# Patient Record
Sex: FEMALE | Race: WHITE | NOT HISPANIC OR LATINO | Employment: FULL TIME | URBAN - NONMETROPOLITAN AREA
[De-identification: names, ages, dates, MRNs, and addresses within clinical notes are randomized per-mention and may not be internally consistent; named-entity substitution may affect disease eponyms.]

---

## 2020-01-30 PROBLEM — Z83.719 FAMILY HISTORY OF POLYPS IN THE COLON: Status: ACTIVE | Noted: 2020-01-30

## 2024-06-25 ENCOUNTER — HOSPITAL ENCOUNTER (EMERGENCY)
Facility: HOSPITAL | Age: 34
Discharge: HOME | End: 2024-06-25

## 2024-06-25 ENCOUNTER — APPOINTMENT (OUTPATIENT)
Dept: GENERAL RADIOLOGY | Facility: HOSPITAL | Age: 34
End: 2024-06-25

## 2024-06-25 VITALS
TEMPERATURE: 98.1 F | HEIGHT: 65 IN | OXYGEN SATURATION: 98 % | BODY MASS INDEX: 48.82 KG/M2 | HEART RATE: 93 BPM | SYSTOLIC BLOOD PRESSURE: 144 MMHG | DIASTOLIC BLOOD PRESSURE: 96 MMHG | WEIGHT: 293 LBS | RESPIRATION RATE: 18 BRPM

## 2024-06-25 DIAGNOSIS — S62.336A DISPLACED FRACTURE OF NECK OF FIFTH METACARPAL BONE, RIGHT HAND, INITIAL ENCOUNTER FOR CLOSED FRACTURE: Primary | ICD-10-CM

## 2024-06-25 PROCEDURE — 73130 X-RAY EXAM OF HAND: CPT

## 2024-06-25 PROCEDURE — 99283 EMERGENCY DEPT VISIT LOW MDM: CPT

## 2024-06-25 RX ORDER — HYDROCODONE BITARTRATE AND ACETAMINOPHEN 5; 325 MG/1; MG/1
1 TABLET ORAL EVERY 8 HOURS PRN
Qty: 12 TABLET | Refills: 0 | Status: SHIPPED | OUTPATIENT
Start: 2024-06-25

## 2024-06-25 RX ORDER — DIAZEPAM 2 MG/1
2 TABLET ORAL ONCE
Status: DISCONTINUED | OUTPATIENT
Start: 2024-06-25 | End: 2024-06-25 | Stop reason: HOSPADM

## 2024-06-25 RX ORDER — ONDANSETRON 4 MG/1
4 TABLET, ORALLY DISINTEGRATING ORAL ONCE
Status: DISCONTINUED | OUTPATIENT
Start: 2024-06-25 | End: 2024-06-25 | Stop reason: HOSPADM

## 2024-06-25 RX ORDER — LIDOCAINE HYDROCHLORIDE 20 MG/ML
10 INJECTION, SOLUTION INFILTRATION; PERINEURAL ONCE
Status: COMPLETED | OUTPATIENT
Start: 2024-06-25 | End: 2024-06-25

## 2024-06-25 RX ADMIN — LIDOCAINE HYDROCHLORIDE 10 ML: 20 INJECTION, SOLUTION INFILTRATION; PERINEURAL at 11:55

## 2024-06-25 NOTE — ED PROVIDER NOTES
Subjective   History of Present Illness    Patient is a pleasant 33-year-old female chief complaint right hand injury status post fall.    Patient describes that she was running after some guinea when fell into a duck hole.  She landed with her right upper extremity extended but her hand was internally rotated and she landed on it medially.  She is right-hand dominant.  She denies any head or neck injury.  She complains immediate pain and deformity.  She went to urgent care and completed an x-ray.  She was told that she does have an obliquely oriented volarly displaced and angulated fracture of the distal fifth metacarpal bone with associated soft tissue swelling.  Because of this, she is advised to the ER for reduction and splinting.  Patient denies any loss sensation.  She does complain of limited movement.  She does complain quite a bit of pain.  She has not eaten today.  She described she is otherwise healthy.  She denies being on a blood thinner.    Review of Systems   All other systems reviewed and are negative.      Past Medical History:   Diagnosis Date    Anxiety     Back pain     Candidiasis of vulva and vagina     Constipation     Contraception     Depression     Family history of colonic polyps     GERD (gastroesophageal reflux disease)     Migraine     Visit for routine gyn exam     Vitamin D deficiency     White coat syndrome without diagnosis of hypertension        Allergies   Allergen Reactions    Amoxicillin Rash    Penicillins Rash       Past Surgical History:   Procedure Laterality Date    CHOLECYSTECTOMY      COLONOSCOPY N/A 2/13/2020    Procedure: COLONOSCOPY WITH ANESTHESIA;  Surgeon: Alfreda Osei MD;  Location: [facility];  Service: Gastroenterology;  Laterality: N/A;  pre: rectal pain, rectal bleeding, family hx polyps  post: diverticulosis   Ceballos, George Montes PA-C    ENDOSCOPY N/A 7/10/2018    Dr. Ovalle Grade B reflux esophagitis-biopsied; Z-line regular-35cm from the  incisors; No gross lesions in the stomach-biopsied; No gross lesions in the second portion of the duodenum    OTHER SURGICAL HISTORY      reset arm    PAP SMEAR  07/23/2012    WISDOM TOOTH EXTRACTION         Family History   Problem Relation Age of Onset    Hypertension Mother     Depression Mother     Colon polyps Mother 48    Diabetes Other         type 2    Hypertension Father     Hyperlipidemia Father     Diabetes Father     Breast cancer Neg Hx     Ovarian cancer Neg Hx     Uterine cancer Neg Hx     Colon cancer Neg Hx     Esophageal cancer Neg Hx     Liver cancer Neg Hx     Liver disease Neg Hx     Rectal cancer Neg Hx     Stomach cancer Neg Hx        Social History     Socioeconomic History    Marital status: Single   Tobacco Use    Smoking status: Never    Smokeless tobacco: Never   Vaping Use    Vaping status: Never Used   Substance and Sexual Activity    Alcohol use: Yes     Comment: Occasionally    Drug use: No    Sexual activity: Yes     Partners: Male     Birth control/protection: None     Comment: stopped Nuvaring       Prior to Admission medications    Medication Sig Start Date End Date Taking? Authorizing Provider   etonogestrel-ethinyl estradiol (NuvaRing) 0.12-0.015 MG/24HR vaginal ring Insert vaginally and leave in place for 3 consecutive weeks, then remove for 1 week. 5/26/21   Fatemeh Duffy APRN   metFORMIN (GLUCOPHAGE) 500 MG tablet TAKE ONE TABLET BY MOUTH 2 TIMES A DAY 9/29/21   Fatemeh Duffy APRN   multivitamin with minerals tablet tablet Take 1 tablet by mouth Daily.    Provider, MD Cyndy       Medications   morphine injection 4 mg (4 mg Intramuscular Not Given 6/25/24 1136)   ondansetron ODT (ZOFRAN-ODT) disintegrating tablet 4 mg (4 mg Oral Not Given 6/25/24 1136)   diazePAM (VALIUM) tablet 2 mg (2 mg Oral Not Given 6/25/24 1136)   lidocaine (XYLOCAINE) 2% injection 10 mL (10 mL Injection Given 6/25/24 1155)       /96 (BP Location: Left arm, Patient Position:  "Sitting)   Pulse 93   Temp 98.1 °F (36.7 °C) (Oral)   Resp 18   Ht 165.1 cm (65\")   Wt (!) 144 kg (317 lb)   LMP 05/27/2024 (Approximate)   SpO2 98%   BMI 52.75 kg/m²       Objective   Physical Exam  Constitutional:       Appearance: Normal appearance. She is obese.   HENT:      Head: Normocephalic.      Nose: Nose normal.      Mouth/Throat:      Mouth: Mucous membranes are moist.   Eyes:      Extraocular Movements: Extraocular movements intact.   Cardiovascular:      Rate and Rhythm: Normal rate and regular rhythm.   Pulmonary:      Effort: Pulmonary effort is normal.      Breath sounds: Normal breath sounds.   Musculoskeletal:         General: Swelling and tenderness present.      Right forearm: Normal.      Right wrist: No tenderness, bony tenderness or snuff box tenderness. Normal range of motion. Normal pulse.      Right hand: Swelling, deformity, tenderness and bony tenderness present. Normal pulse.        Arms:       Cervical back: Normal range of motion and neck supple.      Comments: Patient does have obvious ecchymosis and swelling of the right fifth MP with tenderness along her fifth metacarpal.  Her pinky finger is flexed forward at a 90 degree angle.  She is nontender the rest of her hand with palpable pulses bilaterally.  Nontender wrist.  Negative snuffbox tenderness pain   Skin:     General: Skin is warm.   Neurological:      Mental Status: She is alert.         Upper Extremity Dislocation    Date/Time: 6/25/2024 12:44 PM    Performed by: Porfirio Higuera PA  Authorized by: Porfirio Higuera PA  Consent: The procedure was performed in an emergent situation. Verbal consent obtained. Written consent obtained.  Risks and benefits: risks, benefits and alternatives were discussed  Consent given by: patient  Patient understanding: patient states understanding of the procedure being performed  Site marked: the operative site was marked  Imaging studies: imaging studies " available  Injury location: hand  Location details: right hand  Injury type: fracture-dislocation (reduction of fracture)  Pre-procedure neurovascular assessment: neurovascularly intact  Pre-procedure distal perfusion: normal  Pre-procedure neurological function: normal  Pre-procedure range of motion: normal  Anesthesia: local infiltration    Anesthesia:  Local anesthesia used: yes  Local Anesthetic: lidocaine 1% without epinephrine  Anesthetic total: 6 mL    Sedation:  Patient sedated: no    Manipulation performed: yes  Skin traction used: no  Skeletal traction used: no  Reduction successful: yes  X-ray confirmed reduction: yes  Immobilization: splint  Supplies used: plaster  Post-procedure neurovascular assessment: post-procedure neurovascularly intact  Post-procedure distal perfusion: normal  Post-procedure neurological function: normal  Post-procedure range of motion: normal  Patient tolerance: patient tolerated the procedure well with no immediate complications               Lab Results (last 24 hours)       ** No results found for the last 24 hours. **            XR Hand 3+ View Right    Result Date: 6/25/2024  Narrative: XR HAND 3+ VW RIGHT- 6/25/2024 at 11:56 a.m.  HISTORY: post reduction  COMPARISON: 6/25/2024 exam 11:56 a.m.  FINDINGS: 3 views of the right hand are obtained.  Dramatic interval improvement in the fifth metacarpal shaft fracture with reduction. Only mild residual radial displacement. Prominent soft tissue swelling. No additional fracture identified.  Pression: 1. Marked interval improvement in the alignment of the right fifth metacarpal fracture with reduction.   This report was signed and finalized on 6/25/2024 12:53 PM by Dr. Rasheeda Hahn MD.      XR Hand 3+ View Right    Result Date: 6/25/2024  Narrative: EXAMINATION: XR HAND 3+ VW RIGHT-  6/25/2024 12:15 PM  HISTORY: post reduction  COMPARISON: 6/25/2024  TECHNIQUE: Frontal, lateral and oblique radiographs of the RIGHT hand were  provided for review.  FINDINGS: Obliquely oriented fifth metacarpal fracture with persistent volar angulation. The degree of angulation is very similar to the prior examination. I don't see any definite evidence of any new or additional fracture deformities elsewhere in the hand or wrist. There is overlying soft tissue swelling adjacent to the fifth metacarpal.       Impression:  1.  Persistent angulation of the displaced and angulated acute fifth metacarpal fracture, similar to the previous examination.   This report was signed and finalized on 6/25/2024 12:17 PM by Dr. Severo Flynn MD.      XR Hand 3+ View Right    Result Date: 6/25/2024  Narrative: XR HAND 3+ VW RIGHT- 6/25/2024 8:48 AM  HISTORY: fall, pain and swelling to right hand, worse at fifth knuckle region; W19.XXXA-Unspecified fall, initial encounter; M79.641-Pain in right hand; M79.89-Other specified soft tissue disorders  COMPARISON: None available  FINDINGS: Frontal, lateral, and oblique radiographs of the hand were provided for review.  There is an obliquely oriented fracture of the distal fifth metacarpal bone which is displaced and angulated volarly and angulated slightly laterally. No additional fracture or dislocation is seen. There is soft tissue swelling medially.      Impression:  1. Obliquely oriented volarly displaced and angulated fracture of the distal fifth metacarpal bone with associated soft tissue swelling.  This report was signed and finalized on 6/25/2024 9:57 AM by Cleve Aguilera.       ED Course  ED Course as of 06/25/24 1305   Tue Jun 25, 2024   1247 Patient tolerated reduction without any complications.  She is in a boxer splint.  Short course of pain medication has been prescribed.  Advised patient follow-up with a hand surgeon.  Avoid use of right hand till cleared.  Patient was understanding she will be discharged in stable condition. [TK]      ED Course User Index  [TK] Porfirio Higuera PA           MDM      Final diagnoses:   Displaced fracture of neck of fifth metacarpal bone, right hand, initial encounter for closed fracture       Position: Patient will be discharged in stable condition.       Porfirio Higuera PA  06/25/24 1252       Porfirio Higuera PA  06/25/24 1254       Porfirio Higuera PA  06/25/24 1300

## 2024-06-26 ENCOUNTER — PATIENT ROUNDING (BHMG ONLY) (OUTPATIENT)
Dept: URGENT CARE | Facility: CLINIC | Age: 34
End: 2024-06-26

## 2024-06-26 NOTE — ED NOTES
Thank you for letting us care for you in your recent visit to our urgent care center. We would love to hear about your experience with us. Was this the first time you have visited our location?    We’re always looking for ways to make our patients’ experiences even better. Do you have any recommendations on ways we may improve?     I appreciate you taking the time to respond. Please be on the lookout for a survey about your recent visit from [employer] via text or email. We would greatly appreciate if you could fill that out and turn it back in. We want your voice to be heard and we value your feedback.   Thank you for choosing [facility] for your healthcare needs.

## 2025-01-15 ENCOUNTER — OFFICE VISIT (OUTPATIENT)
Dept: OBGYN CLINIC | Age: 35
End: 2025-01-15
Payer: COMMERCIAL

## 2025-01-15 VITALS
HEIGHT: 63 IN | BODY MASS INDEX: 51.91 KG/M2 | WEIGHT: 293 LBS | DIASTOLIC BLOOD PRESSURE: 100 MMHG | SYSTOLIC BLOOD PRESSURE: 156 MMHG | HEART RATE: 94 BPM

## 2025-01-15 DIAGNOSIS — Z11.51 ENCOUNTER FOR SCREENING FOR HUMAN PAPILLOMAVIRUS (HPV): ICD-10-CM

## 2025-01-15 DIAGNOSIS — R41.89 BRAIN FOG: ICD-10-CM

## 2025-01-15 DIAGNOSIS — Z13.31 STANDARDIZED ADULT DEPRESSION SCREENING TOOL COMPLETED: ICD-10-CM

## 2025-01-15 DIAGNOSIS — Z76.89 ENCOUNTER TO ESTABLISH CARE: Primary | ICD-10-CM

## 2025-01-15 DIAGNOSIS — Z01.419 ENCOUNTER FOR WELL WOMAN EXAM WITH ROUTINE GYNECOLOGICAL EXAM: ICD-10-CM

## 2025-01-15 DIAGNOSIS — Z12.4 ENCOUNTER FOR SCREENING FOR CERVICAL CANCER: ICD-10-CM

## 2025-01-15 DIAGNOSIS — N92.1 MENORRHAGIA WITH IRREGULAR CYCLE: ICD-10-CM

## 2025-01-15 DIAGNOSIS — E55.9 VITAMIN D DEFICIENCY: ICD-10-CM

## 2025-01-15 DIAGNOSIS — R03.0 ELEVATED BLOOD PRESSURE READING: ICD-10-CM

## 2025-01-15 DIAGNOSIS — Z12.39 ENCOUNTER FOR SCREENING BREAST EXAMINATION: ICD-10-CM

## 2025-01-15 PROCEDURE — 99204 OFFICE O/P NEW MOD 45 MIN: CPT

## 2025-01-15 PROCEDURE — 99395 PREV VISIT EST AGE 18-39: CPT

## 2025-01-15 ASSESSMENT — ANXIETY QUESTIONNAIRES
5. BEING SO RESTLESS THAT IT IS HARD TO SIT STILL: NOT AT ALL
GAD7 TOTAL SCORE: 4
2. NOT BEING ABLE TO STOP OR CONTROL WORRYING: NOT AT ALL
6. BECOMING EASILY ANNOYED OR IRRITABLE: MORE THAN HALF THE DAYS
1. FEELING NERVOUS, ANXIOUS, OR ON EDGE: SEVERAL DAYS
4. TROUBLE RELAXING: SEVERAL DAYS
7. FEELING AFRAID AS IF SOMETHING AWFUL MIGHT HAPPEN: NOT AT ALL
3. WORRYING TOO MUCH ABOUT DIFFERENT THINGS: NOT AT ALL

## 2025-01-15 ASSESSMENT — PATIENT HEALTH QUESTIONNAIRE - PHQ9
2. FEELING DOWN, DEPRESSED OR HOPELESS: SEVERAL DAYS
SUM OF ALL RESPONSES TO PHQ QUESTIONS 1-9: 1
SUM OF ALL RESPONSES TO PHQ QUESTIONS 1-9: 1
SUM OF ALL RESPONSES TO PHQ9 QUESTIONS 1 & 2: 1
SUM OF ALL RESPONSES TO PHQ QUESTIONS 1-9: 1
SUM OF ALL RESPONSES TO PHQ QUESTIONS 1-9: 1
1. LITTLE INTEREST OR PLEASURE IN DOING THINGS: NOT AT ALL

## 2025-01-15 NOTE — PROGRESS NOTES
Pt presents today for a new pt visit for a pap smear and breast exam. Pt states she has a family history of cervical cancer. Pt states every woman on her mother's side had endometriosis. She also complains of having two periods a month. Pt states she has had really bad brain fog over the last few years around her cycle. States the two days before and day after her period the brain fog is present. Pt has not been to an OB/GYN in 4 years so she is very anxious for this appt today.     Depression screening score: 2  Anxiety screening score: 4  Last mammogram: N/A    Last pap smear:   Contraception: None    : 0    Para: 0    AB: 0   Last bone density: N/A   Last colonoscopy: 2019   Sexual Active: Yes    
facility-administered medications for this visit.     Allergies   Allergen Reactions    Amoxicillin     Pcn [Penicillins] Other (See Comments)     Unknown       Vitals:    01/15/25 1248   BP: (!) 156/100   Site: Left Upper Arm   Position: Sitting   Cuff Size: Large Adult   Pulse: 94   Weight: (!) 145.2 kg (320 lb)   Height: 1.6 m (5' 3\")     Body mass index is 56.69 kg/m².    Review of Systems    Physical Exam    MEDICATIONS:  No orders of the defined types were placed in this encounter.      ORDERS:  Orders Placed This Encounter   Procedures    PAP SMEAR    Human papillomavirus (HPV) DNA Probe Thin Prep High Risk    CBC    Comprehensive Metabolic Panel    Hemoglobin A1C    Lipid Panel    T4, Free    TSH    Vitamin D 25 Hydroxy    Testosterone Free and Total, Non-Male    Luteinizing Hormone    Follicle Stimulating Hormone    Prolactin    Progesterone    Insulin Free & Total    Ferritin    Iron and TIBC         1/15/2025     1:06 PM 9/29/2023     5:24 PM 9/29/2023     4:52 PM   PHQ-9    Little interest or pleasure in doing things 0     Feeling down, depressed, or hopeless 1     Trouble falling or staying asleep, or sleeping too much      Feeling tired or having little energy      Poor appetite or overeating      Feeling bad about yourself - or that you are a failure or have let yourself or your family down      Trouble concentrating on things, such as reading the newspaper or watching television      Moving or speaking so slowly that other people could have noticed. Or the opposite - being so fidgety or restless that you have been moving around a lot more than usual      Thoughts that you would be better off dead, or of hurting yourself in some way      PHQ-2 Score 1     PHQ-9 Total Score 1     If you checked off any problems, how difficult have these problems made it for you to do your work, take care of things at home, or get along with other people?          Information is confidential and restricted. Go to Review

## 2025-01-18 LAB
HPV HR 12 DNA SPEC QL NAA+PROBE: NOT DETECTED
HPV16 DNA SPEC QL NAA+PROBE: NOT DETECTED
HPV16+18+H RISK 12 DNA SPEC-IMP: NORMAL
HPV18 DNA SPEC QL NAA+PROBE: NOT DETECTED

## 2025-01-24 DIAGNOSIS — Z01.419 ENCOUNTER FOR WELL WOMAN EXAM WITH ROUTINE GYNECOLOGICAL EXAM: ICD-10-CM

## 2025-01-24 DIAGNOSIS — E55.9 VITAMIN D DEFICIENCY: ICD-10-CM

## 2025-01-24 DIAGNOSIS — N92.1 MENORRHAGIA WITH IRREGULAR CYCLE: ICD-10-CM

## 2025-01-24 DIAGNOSIS — R41.89 BRAIN FOG: ICD-10-CM

## 2025-01-24 LAB
25(OH)D3 SERPL-MCNC: 24.9 NG/ML
ALBUMIN SERPL-MCNC: 4.1 G/DL (ref 3.5–5.2)
ALP SERPL-CCNC: 98 U/L (ref 35–104)
ALT SERPL-CCNC: 12 U/L (ref 5–33)
ANION GAP SERPL CALCULATED.3IONS-SCNC: 11 MMOL/L (ref 8–16)
AST SERPL-CCNC: 12 U/L (ref 5–32)
BILIRUB SERPL-MCNC: 0.5 MG/DL (ref 0.2–1.2)
BUN SERPL-MCNC: 10 MG/DL (ref 6–20)
CALCIUM SERPL-MCNC: 9.2 MG/DL (ref 8.6–10)
CHLORIDE SERPL-SCNC: 100 MMOL/L (ref 98–107)
CHOLEST SERPL-MCNC: 163 MG/DL (ref 0–199)
CO2 SERPL-SCNC: 26 MMOL/L (ref 22–29)
CREAT SERPL-MCNC: 0.6 MG/DL (ref 0.5–0.9)
ERYTHROCYTE [DISTWIDTH] IN BLOOD BY AUTOMATED COUNT: 13.9 % (ref 11.5–14.5)
FERRITIN SERPL-MCNC: 59.2 NG/ML (ref 13–150)
FSH SERPL-SCNC: 4.8 MIU/ML
GLUCOSE SERPL-MCNC: 92 MG/DL (ref 70–99)
HBA1C MFR BLD: 5.4 % (ref 4–5.6)
HCT VFR BLD AUTO: 40.5 % (ref 37–47)
HDLC SERPL-MCNC: 60 MG/DL (ref 40–60)
HGB BLD-MCNC: 13.1 G/DL (ref 12–16)
IRON SATN MFR SERPL: 23 % (ref 15–50)
IRON SERPL-MCNC: 71 UG/DL (ref 37–145)
LDLC SERPL CALC-MCNC: 92 MG/DL
LH SERPL-ACNC: 16 MIU/ML
MCH RBC QN AUTO: 28.4 PG (ref 27–31)
MCHC RBC AUTO-ENTMCNC: 32.3 G/DL (ref 33–37)
MCV RBC AUTO: 87.9 FL (ref 81–99)
PLATELET # BLD AUTO: 344 K/UL (ref 130–400)
PMV BLD AUTO: 10.7 FL (ref 9.4–12.3)
POTASSIUM SERPL-SCNC: 4.3 MMOL/L (ref 3.5–5.1)
PROGEST SERPL-MCNC: 1.43 NG/ML
PROLACTIN SERPL-MCNC: 24.29 NG/ML (ref 4.79–23.3)
PROT SERPL-MCNC: 7.1 G/DL (ref 6.4–8.3)
RBC # BLD AUTO: 4.61 M/UL (ref 4.2–5.4)
SODIUM SERPL-SCNC: 137 MMOL/L (ref 136–145)
T4 FREE SERPL-MCNC: 1.37 NG/DL (ref 0.93–1.7)
TIBC SERPL-MCNC: 313 UG/DL (ref 250–400)
TRIGL SERPL-MCNC: 57 MG/DL (ref 0–149)
TSH SERPL DL<=0.005 MIU/L-ACNC: 0.95 UIU/ML (ref 0.27–4.2)
WBC # BLD AUTO: 9.4 K/UL (ref 4.8–10.8)

## 2025-01-27 LAB
INSULIN FREE SERPL-ACNC: 14 UIU/ML (ref 3–25)
INSULIN SERPL-ACNC: 16 UIU/ML (ref 3–25)
SHBG SERPL-SCNC: 38 NMOL/L (ref 25–122)
TESTOST FREE SERPL-MCNC: 4.9 PG/ML (ref 1.3–9.2)
TESTOST SERPL-MCNC: 32 NG/DL (ref 9–55)

## 2025-02-12 ENCOUNTER — TELEMEDICINE (OUTPATIENT)
Dept: OBGYN CLINIC | Age: 35
End: 2025-02-12
Payer: COMMERCIAL

## 2025-02-12 DIAGNOSIS — R79.89 ELEVATED PROLACTIN LEVEL: Primary | ICD-10-CM

## 2025-02-12 DIAGNOSIS — Z09 FOLLOW-UP EXAM: ICD-10-CM

## 2025-02-12 PROCEDURE — 99213 OFFICE O/P EST LOW 20 MIN: CPT

## 2025-02-12 ASSESSMENT — ENCOUNTER SYMPTOMS
BACK PAIN: 0
RECTAL PAIN: 0
CHEST TIGHTNESS: 0
RESPIRATORY NEGATIVE: 1
GASTROINTESTINAL NEGATIVE: 1
CONSTIPATION: 0
DIARRHEA: 0
ABDOMINAL PAIN: 0
SHORTNESS OF BREATH: 0
NAUSEA: 0

## 2025-02-12 NOTE — PROGRESS NOTES
[facility] OB/[facility]  CN Office Note  TELEHEALTH EVALUATION -- Audio/Visual (During COVID-19 public health emergency)    Ivonne Haley is a 34 y.o. female who presents today for her medical conditions/ complaints as noted below.  Chief Complaint   Patient presents with    Other     History of Present Illness  The patient presents via virtual visit for discussion of lab work.    She reports experiencing 2 menstrual cycles in the previous month. She does not report any symptoms typically associated with polycystic ovary syndrome (PCOS), such as hirsutism, alopecia, or acne on the jawline, neck, chest, or back. However, she notes hyperpigmentation in the intercrural region and occasional acne around her nasal area. She has no history of childbirth or breastfeeding. She has previously been prescribed metformin and is currently on a weight loss regimen involving injections, which she initiated at the end of the last month.    MEDICATIONS  Past: Metformin    Patient Active Problem List   Diagnosis    Suicidal behavior with attempted self-injury (HCC)    Major depressive disorder, recurrent severe without psychotic features (HCC)       Patient's last menstrual period was 01/10/2025 (exact date).      Past Medical History:   Diagnosis Date    Borderline personality disorder (HCC)      Past Surgical History:   Procedure Laterality Date    CHOLECYSTECTOMY      COLONOSCOPY  2019    WRIST SURGERY       Family History   Problem Relation Age of Onset    Endometriosis Mother     Cervical Cancer Mother     Endometriosis Maternal Grandmother     Cervical Cancer Maternal Aunt     Endometriosis Maternal Aunt      Social History     Tobacco Use    Smoking status: Never    Smokeless tobacco: Never   Substance Use Topics    Alcohol use: Not Currently     Comment: 2 margaritas weekly       Current Outpatient Medications   Medication Sig Dispense Refill    Ergocalciferol (VITAMIN D) 62099 units CAPS Take 50,000 Units by

## 2025-02-13 DIAGNOSIS — R79.89 ELEVATED PROLACTIN LEVEL: ICD-10-CM

## 2025-02-13 LAB — PROLACTIN SERPL-MCNC: 34.58 NG/ML (ref 4.79–23.3)

## 2025-02-20 ENCOUNTER — HOSPITAL ENCOUNTER (OUTPATIENT)
Dept: MRI IMAGING | Age: 35
Discharge: HOME | End: 2025-02-20
Payer: COMMERCIAL

## 2025-02-20 DIAGNOSIS — R79.89 ELEVATED PROLACTIN LEVEL: ICD-10-CM

## 2025-02-20 PROCEDURE — A9577 INJ MULTIHANCE: HCPCS

## 2025-02-20 PROCEDURE — 6360000004 HC RX CONTRAST MEDICATION

## 2025-02-20 PROCEDURE — 70553 MRI BRAIN STEM W/O & W/DYE: CPT

## 2025-02-20 RX ADMIN — GADOBENATE DIMEGLUMINE 20 ML: 529 INJECTION, SOLUTION INTRAVENOUS at 08:28

## 2025-03-06 RX ORDER — CABERGOLINE 0.5 MG/1
0.25 TABLET ORAL
Qty: 8 TABLET | Refills: 0 | Status: SHIPPED | OUTPATIENT
Start: 2025-03-06 | End: 2025-03-06

## 2025-03-06 RX ORDER — CABERGOLINE 0.5 MG/1
0.25 TABLET ORAL
Qty: 4 TABLET | Refills: 0 | Status: SHIPPED | OUTPATIENT
Start: 2025-03-06

## 2025-04-02 ENCOUNTER — HOSPITAL ENCOUNTER (OUTPATIENT)
Dept: SLEEP CENTER | Age: 35
Discharge: HOME | End: 2025-04-04
Payer: COMMERCIAL

## 2025-04-02 DIAGNOSIS — R79.89 INCREASED PROLACTIN LEVEL: Primary | ICD-10-CM

## 2025-04-02 PROCEDURE — G0399 HOME SLEEP TEST/TYPE 3 PORTA: HCPCS

## 2025-04-02 NOTE — PROGRESS NOTES
Pt in the office to  HST monitor.  Pt was educated on how to use equipment properly and instructed to wear for 2 nights and to return on Friday.  Pt states she understood.

## 2025-04-16 ENCOUNTER — FOLLOWUP TELEPHONE ENCOUNTER (OUTPATIENT)
Dept: SLEEP CENTER | Age: 35
End: 2025-04-16

## 2025-04-16 DIAGNOSIS — R79.89 INCREASED PROLACTIN LEVEL: ICD-10-CM

## 2025-04-16 LAB — PROLACTIN SERPL-MCNC: 1.1 NG/ML (ref 4.79–23.3)

## 2025-04-16 NOTE — TELEPHONE ENCOUNTER
Called and spoke to patient and discussed results of the home sleep study.  Home sleep study report was sent to the referring provider.

## 2025-05-01 ENCOUNTER — TELEMEDICINE (OUTPATIENT)
Dept: OBGYN CLINIC | Age: 35
End: 2025-05-01
Payer: COMMERCIAL

## 2025-05-01 DIAGNOSIS — R79.89 INCREASED PROLACTIN LEVEL: ICD-10-CM

## 2025-05-01 DIAGNOSIS — N92.6 IRREGULAR BLEEDING: Primary | ICD-10-CM

## 2025-05-01 PROCEDURE — 99214 OFFICE O/P EST MOD 30 MIN: CPT

## 2025-05-01 NOTE — PROGRESS NOTES
[facility] OB/GYN  CN Office Note  TELEHEALTH EVALUATION -- Audio/Visual (During COVID-19 public health emergency)    Ivonne Haley is a 34 y.o. female who presents today for her medical conditions/ complaints as noted below.  Chief Complaint   Patient presents with    Follow-up     HPI  History of Present Illness  The patient presents for evaluation of abnormal uterine bleeding.    Resumption of menstrual cycle occurred following the discontinuation of Dostinex on 2025. Bleeding began approximately one week post-blood work and has been persistent with a light flow. Intermittent cramping and mood swings are reported. The bleeding, which has not shown any signs of abating, is occasionally red or brown in color. This is not believed to be her regular menstrual cycle due to the light nature of the flow, contrasting with her typically heavy periods. No other medications are currently being taken. An ultrasound of the uterus has not been conducted, but an MRI revealed a normal pituitary gland. Previous prescription of metformin caused initial gastrointestinal upset but was otherwise well-tolerated. The medication was added to help with weight loss.    Patient Active Problem List   Diagnosis    Suicidal behavior with attempted self-injury (HCC)    Major depressive disorder, recurrent severe without psychotic features (HCC)       No LMP recorded.      Past Medical History:   Diagnosis Date    Borderline personality disorder (HCC)      Past Surgical History:   Procedure Laterality Date    CHOLECYSTECTOMY      COLONOSCOPY  2019    WRIST SURGERY       Family History   Problem Relation Age of Onset    Endometriosis Mother     Cervical Cancer Mother     Endometriosis Maternal Grandmother     Cervical Cancer Maternal Aunt     Endometriosis Maternal Aunt      Social History     Tobacco Use    Smoking status: Never    Smokeless tobacco: Never   Substance Use Topics    Alcohol use: Not Currently

## 2025-05-02 ASSESSMENT — ENCOUNTER SYMPTOMS
DIARRHEA: 0
CHEST TIGHTNESS: 0
NAUSEA: 0
CONSTIPATION: 0
BACK PAIN: 0
GASTROINTESTINAL NEGATIVE: 1
SHORTNESS OF BREATH: 0
ABDOMINAL PAIN: 0
RECTAL PAIN: 0
RESPIRATORY NEGATIVE: 1

## 2025-05-19 ENCOUNTER — TRANSCRIBE ORDERS (OUTPATIENT)
Dept: ADMINISTRATIVE | Age: 35
End: 2025-05-19

## 2025-05-19 DIAGNOSIS — R10.32 LEFT LOWER QUADRANT PAIN: Primary | ICD-10-CM
